# Patient Record
Sex: MALE | Race: WHITE | URBAN - METROPOLITAN AREA
[De-identification: names, ages, dates, MRNs, and addresses within clinical notes are randomized per-mention and may not be internally consistent; named-entity substitution may affect disease eponyms.]

---

## 2023-05-15 ENCOUNTER — OFFICE VISIT (OUTPATIENT)
Dept: URBAN - METROPOLITAN AREA CLINIC 107 | Facility: CLINIC | Age: 53
End: 2023-05-15

## 2023-05-31 ENCOUNTER — WEB ENCOUNTER (OUTPATIENT)
Dept: URBAN - METROPOLITAN AREA CLINIC 107 | Facility: CLINIC | Age: 53
End: 2023-05-31

## 2023-06-05 ENCOUNTER — OFFICE VISIT (OUTPATIENT)
Dept: URBAN - METROPOLITAN AREA CLINIC 107 | Facility: CLINIC | Age: 53
End: 2023-06-05
Payer: COMMERCIAL

## 2023-06-05 VITALS
WEIGHT: 186 LBS | HEART RATE: 56 BPM | BODY MASS INDEX: 25.19 KG/M2 | RESPIRATION RATE: 16 BRPM | DIASTOLIC BLOOD PRESSURE: 64 MMHG | HEIGHT: 72 IN | SYSTOLIC BLOOD PRESSURE: 110 MMHG | TEMPERATURE: 98.2 F

## 2023-06-05 DIAGNOSIS — R19.4 CHANGE IN BOWEL HABIT: ICD-10-CM

## 2023-06-05 PROCEDURE — 99244 OFF/OP CNSLTJ NEW/EST MOD 40: CPT | Performed by: NURSE PRACTITIONER

## 2023-06-05 PROCEDURE — 99204 OFFICE O/P NEW MOD 45 MIN: CPT | Performed by: NURSE PRACTITIONER

## 2023-06-05 RX ORDER — VALACYCLOVIR 1 G/1
1 TABLET TABLET, FILM COATED ORAL ONCE A DAY
Status: ACTIVE | COMMUNITY

## 2023-06-05 RX ORDER — FINASTERIDE 1 MG/1
1 TABLET TABLET, FILM COATED ORAL ONCE A DAY
Status: ACTIVE | COMMUNITY

## 2023-06-05 NOTE — HPI-TODAY'S VISIT:
52-year-old male with a history of herpes, allergic rhinitis, former tobacco user, referred by Dr. Reece Wing for evaluation of chronic diarrhea.  A copy of today's visit will be forwarded to the referring provider.  He reports a past medical history of "heart flutters/palpitations" secondary to caffeine use. Otherwise no cardiac history. He reports a month long history of diarrhea predominant bowel habits in 2015. Otherwise, he has been fairly healthy from a GI standpoint. Surgical history is notable for wisdom teeth extraction. There is no family history of liver disease, pancreas disease, stomach cancer, esophagus cancer or colon cancer. No family hisory of colon polyps. He does drink alcohol: around 2 glasses of wine 5 nights per week. No tobacco use currently. He is a former smoker. No drug use. He does admit Advil use as needed for muscle pain related to tennis playing; maybe 2 to 3 times per month.  There is no dysphagia. He admits occasional heartburn for which he takes Tums twice per month. There has been some lower abdominal cramping or gas pains, alleviated with passing gas or having or bowel movement. No nausea or vomiting. His appetite is good. He does admit some fear of eating out of concern for diarrhea. His weight is stable. He has bowel movements 2 or 3 times per day. Stools are loose and watery. There are episodes of more acute diarrhea with as many as 4 to 5 loose,watery stools in a matter of hours. There is no blood per rectum. No fever or chills. A course of Xifaxan did help initially, lasting effects for 6 to 8 weeks. He feels bloated occasionally as well. He admits some urgency after meals. He is not currently on a fiber supplement. He followed the BRAT diet for about one month when symptoms first started. He stopped fiber supplement. He has used Florastor, but has not continued this becaues it did not seem most effective. He did have stool studies with urgent care in October 2022 when diarrhea predominant habits began, which he states were negative.  Cologuard 8/16/2022:Negative.

## 2023-06-06 LAB
A/G RATIO: 1.6
ABSOLUTE BASOPHILS: 49
ABSOLUTE EOSINOPHILS: 81
ABSOLUTE LYMPHOCYTES: 2025
ABSOLUTE MONOCYTES: 551
ABSOLUTE NEUTROPHILS: 5395
ALBUMIN: 3.9
ALKALINE PHOSPHATASE: 63
ALT (SGPT): 18
AST (SGOT): 21
BASOPHILS: 0.6
BILIRUBIN, TOTAL: 0.8
BUN/CREATININE RATIO: (no result)
BUN: 15
C-REACTIVE PROTEIN, QUANT: 5.7
CALCIUM: 9
CARBON DIOXIDE, TOTAL: 28
CHLORIDE: 103
CREATININE: 0.93
EGFR: 100
EOSINOPHILS: 1
GLOBULIN, TOTAL: 2.5
GLUCOSE: 99
HEMATOCRIT: 40.5
HEMOGLOBIN: 14.1
IMMUNOGLOBULIN A, QN, SERUM: 136
INTERPRETATION: (no result)
LYMPHOCYTES: 25
MCH: 32.3
MCHC: 34.8
MCV: 92.9
MONOCYTES: 6.8
MPV: 10.4
NEUTROPHILS: 66.6
PLATELET COUNT: 310
POTASSIUM: 4.6
PROTEIN, TOTAL: 6.4
RDW: 11.6
RED BLOOD CELL COUNT: 4.36
SODIUM: 139
T-TRANSGLUTAMINASE (TTG) IGA: <1
WHITE BLOOD CELL COUNT: 8.1

## 2023-06-12 LAB
CALPROTECTIN, FECAL: 125
CLOSTRIDIUM DIFFICILE: (no result)

## 2023-06-13 ENCOUNTER — TELEPHONE ENCOUNTER (OUTPATIENT)
Dept: URBAN - METROPOLITAN AREA CLINIC 113 | Facility: CLINIC | Age: 53
End: 2023-06-13

## 2023-06-13 ENCOUNTER — LAB OUTSIDE AN ENCOUNTER (OUTPATIENT)
Dept: URBAN - METROPOLITAN AREA CLINIC 113 | Facility: CLINIC | Age: 53
End: 2023-06-13

## 2023-06-13 LAB — PANCREATIC ELASTASE, FECAL: 139

## 2023-06-13 RX ORDER — FINASTERIDE 1 MG/1
1 TABLET TABLET, FILM COATED ORAL ONCE A DAY
Status: ACTIVE | COMMUNITY

## 2023-06-13 RX ORDER — VALACYCLOVIR 1 G/1
1 TABLET TABLET, FILM COATED ORAL ONCE A DAY
Status: ACTIVE | COMMUNITY

## 2023-06-14 ENCOUNTER — TELEPHONE ENCOUNTER (OUTPATIENT)
Dept: URBAN - METROPOLITAN AREA CLINIC 113 | Facility: CLINIC | Age: 53
End: 2023-06-14

## 2023-06-14 ENCOUNTER — LAB OUTSIDE AN ENCOUNTER (OUTPATIENT)
Dept: URBAN - METROPOLITAN AREA CLINIC 113 | Facility: CLINIC | Age: 53
End: 2023-06-14

## 2023-07-25 ENCOUNTER — OFFICE VISIT (OUTPATIENT)
Dept: URBAN - METROPOLITAN AREA SURGERY CENTER 25 | Facility: SURGERY CENTER | Age: 53
End: 2023-07-25
Payer: COMMERCIAL

## 2023-07-25 ENCOUNTER — CLAIMS CREATED FROM THE CLAIM WINDOW (OUTPATIENT)
Dept: URBAN - METROPOLITAN AREA CLINIC 4 | Facility: CLINIC | Age: 53
End: 2023-07-25
Payer: COMMERCIAL

## 2023-07-25 DIAGNOSIS — R10.84 ABDOMINAL CRAMPING, GENERALIZED: ICD-10-CM

## 2023-07-25 DIAGNOSIS — R10.30 ABDOMINAL PAIN, LOWER: ICD-10-CM

## 2023-07-25 DIAGNOSIS — K52.831 COLITIS, UNSPEC (558.9): ICD-10-CM

## 2023-07-25 DIAGNOSIS — K52.831 COLLAGENOUS COLITIS: ICD-10-CM

## 2023-07-25 DIAGNOSIS — R19.7 ACUTE DIARRHEA: ICD-10-CM

## 2023-07-25 DIAGNOSIS — K52.831 CC (COLLAGENOUS COLITIS): ICD-10-CM

## 2023-07-25 PROCEDURE — 00811 ANES LWR INTST NDSC NOS: CPT | Performed by: ANESTHESIOLOGY

## 2023-07-25 PROCEDURE — 88342 IMHCHEM/IMCYTCHM 1ST ANTB: CPT | Performed by: PATHOLOGY

## 2023-07-25 PROCEDURE — 00811 ANES LWR INTST NDSC NOS: CPT | Performed by: NURSE ANESTHETIST, CERTIFIED REGISTERED

## 2023-07-25 PROCEDURE — G8907 PT DOC NO EVENTS ON DISCHARG: HCPCS | Performed by: INTERNAL MEDICINE

## 2023-07-25 PROCEDURE — 45380 COLONOSCOPY AND BIOPSY: CPT | Performed by: INTERNAL MEDICINE

## 2023-07-25 PROCEDURE — 88313 SPECIAL STAINS GROUP 2: CPT | Performed by: PATHOLOGY

## 2023-07-25 PROCEDURE — 88305 TISSUE EXAM BY PATHOLOGIST: CPT | Performed by: PATHOLOGY

## 2023-07-25 RX ORDER — FINASTERIDE 1 MG/1
1 TABLET TABLET, FILM COATED ORAL ONCE A DAY
Status: ACTIVE | COMMUNITY

## 2023-07-25 RX ORDER — VALACYCLOVIR 1 G/1
1 TABLET TABLET, FILM COATED ORAL ONCE A DAY
Status: ACTIVE | COMMUNITY

## 2023-08-03 ENCOUNTER — OFFICE VISIT (OUTPATIENT)
Dept: URBAN - METROPOLITAN AREA CLINIC 113 | Facility: CLINIC | Age: 53
End: 2023-08-03

## 2023-08-08 ENCOUNTER — OFFICE VISIT (OUTPATIENT)
Dept: URBAN - METROPOLITAN AREA CLINIC 113 | Facility: CLINIC | Age: 53
End: 2023-08-08
Payer: COMMERCIAL

## 2023-08-08 VITALS
HEART RATE: 55 BPM | DIASTOLIC BLOOD PRESSURE: 62 MMHG | BODY MASS INDEX: 23.98 KG/M2 | WEIGHT: 177 LBS | TEMPERATURE: 97.1 F | HEIGHT: 72 IN | RESPIRATION RATE: 14 BRPM | SYSTOLIC BLOOD PRESSURE: 115 MMHG

## 2023-08-08 DIAGNOSIS — R19.7 CHRONIC DIARRHEA: ICD-10-CM

## 2023-08-08 DIAGNOSIS — K86.81 EXOCRINE PANCREATIC INSUFFICIENCY: ICD-10-CM

## 2023-08-08 DIAGNOSIS — K52.831 COLLAGENOUS COLITIS: ICD-10-CM

## 2023-08-08 PROCEDURE — 99214 OFFICE O/P EST MOD 30 MIN: CPT | Performed by: INTERNAL MEDICINE

## 2023-08-08 RX ORDER — VALACYCLOVIR 1 G/1
1 TABLET TABLET, FILM COATED ORAL ONCE A DAY
Status: ACTIVE | COMMUNITY

## 2023-08-08 RX ORDER — BUDESONIDE 9 MG/1
1 TABLET IN THE MORNING TABLET, FILM COATED, EXTENDED RELEASE ORAL ONCE A DAY
Qty: 30 | Refills: 1 | OUTPATIENT
Start: 2023-08-08 | End: 2023-10-07

## 2023-08-08 RX ORDER — FINASTERIDE 1 MG/1
1 TABLET TABLET, FILM COATED ORAL ONCE A DAY
Status: ACTIVE | COMMUNITY

## 2023-08-08 NOTE — HPI-TODAY'S VISIT:
53 yo male with chronic diarrhea, ongoing since October 2022, possibly related to a functional bowel disorder, improved with a course of Xifaxan, presenting for follow up.  Stools studies revealed negative C. difficile toxin, and low pancreas elastase. Fecal calprotectin was elevated. Celiac panel, CBC, CMP and CRP were normal.    A colonoscopy performed for chronic diarrhea and positive fecal calprotectin on 7/25/23 revealed a normal ileum, normal rectum, and normal mucosa in the colon s/p random biopsies. Biopsies were positive for collagenous colitis.   CT a/p with contrast 6/22/23 was unremarkable.  His symptoms are unchanged. A low FODMAP diet was not overly helpful. No blood per rectum. No abdominal pain. He is in good spirits.

## 2023-10-04 ENCOUNTER — ERX REFILL RESPONSE (OUTPATIENT)
Dept: URBAN - METROPOLITAN AREA CLINIC 113 | Facility: CLINIC | Age: 53
End: 2023-10-04

## 2023-10-04 RX ORDER — BUDESONIDE 9 MG/1
TAKE 1 TABLET BY MOUTH EVERY DAY IN THE MORNING FOR 30 DAYS TABLET, EXTENDED RELEASE ORAL
Qty: 30 TABLET | Refills: 1 | OUTPATIENT

## 2023-10-04 RX ORDER — BUDESONIDE 9 MG/1
1 TABLET IN THE MORNING TABLET, FILM COATED, EXTENDED RELEASE ORAL ONCE A DAY
Qty: 30 | Refills: 1 | OUTPATIENT

## 2023-10-10 ENCOUNTER — OFFICE VISIT (OUTPATIENT)
Dept: URBAN - METROPOLITAN AREA CLINIC 113 | Facility: CLINIC | Age: 53
End: 2023-10-10
Payer: COMMERCIAL

## 2023-10-10 VITALS
HEART RATE: 76 BPM | TEMPERATURE: 97.8 F | BODY MASS INDEX: 23.65 KG/M2 | SYSTOLIC BLOOD PRESSURE: 121 MMHG | WEIGHT: 174.6 LBS | HEIGHT: 72 IN | DIASTOLIC BLOOD PRESSURE: 66 MMHG | RESPIRATION RATE: 14 BRPM

## 2023-10-10 DIAGNOSIS — K52.831 COLLAGENOUS COLITIS: ICD-10-CM

## 2023-10-10 DIAGNOSIS — R19.7 ACUTE DIARRHEA: ICD-10-CM

## 2023-10-10 DIAGNOSIS — K86.81 EXOCRINE PANCREATIC INSUFFICIENCY: ICD-10-CM

## 2023-10-10 PROCEDURE — 99214 OFFICE O/P EST MOD 30 MIN: CPT | Performed by: NURSE PRACTITIONER

## 2023-10-10 RX ORDER — PANCRELIPASE 36000; 180000; 114000 [USP'U]/1; [USP'U]/1; [USP'U]/1
2 TABLETS WITH MEALS, AND 1 TABLET WITH SNACKS CAPSULE, DELAYED RELEASE PELLETS ORAL
Qty: 630 | Refills: 3 | OUTPATIENT
Start: 2023-10-10 | End: 2024-10-04

## 2023-10-10 RX ORDER — VALACYCLOVIR 1 G/1
1 TABLET TABLET, FILM COATED ORAL ONCE A DAY
Status: ACTIVE | COMMUNITY

## 2023-10-10 RX ORDER — BUDESONIDE 9 MG/1
TAKE 1 TABLET BY MOUTH EVERY DAY IN THE MORNING FOR 30 DAYS TABLET, EXTENDED RELEASE ORAL
Qty: 30 TABLET | Refills: 1 | Status: ACTIVE | COMMUNITY

## 2023-10-10 RX ORDER — FINASTERIDE 1 MG/1
1 TABLET TABLET, FILM COATED ORAL ONCE A DAY
Status: ACTIVE | COMMUNITY

## 2023-10-10 NOTE — HPI-TODAY'S VISIT:
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  He was seen in the office 8/8/2023 in follow-up for evaluation of chronic diarrhea.  Stool studies revealed a low pancreas elastase consistent with exocrine pancreas insufficiency.  He was also noted to have an elevated fecal calprotectin with colonoscopy notable for random biopsies positive for collagenous colitis.  He was treated with budesonide 9 mg daily for 8 weeks, then stopping.  A trial of Creon was a consideration.  He experienced improvement in his diarrhea with use of budesonide 9 mg daily. He completed the medication on Friday. He celebrated his birthday over the weekend with rich foods. He had some recurrence of diarrhea over the weekend, which resolved with one dose of kaopectate. There is no abdominal pain, nausea or vomiting. His appetite is fair. No blood per rectum.

## 2023-10-23 ENCOUNTER — TELEPHONE ENCOUNTER (OUTPATIENT)
Dept: URBAN - METROPOLITAN AREA CLINIC 113 | Facility: CLINIC | Age: 53
End: 2023-10-23

## 2023-10-23 RX ORDER — PANCRELIPASE LIPASE, PANCRELIPASE PROTEASE, PANCRELIPASE AMYLASE 40000; 126000; 168000 [USP'U]/1; [USP'U]/1; [USP'U]/1
2 CAPSULES WITH MEALS, ONE WITH SNACKS CAPSULE, DELAYED RELEASE ORAL
Qty: 210 | Refills: 3 | OUTPATIENT
Start: 2023-10-23 | End: 2024-02-19

## 2023-11-15 ENCOUNTER — WEB ENCOUNTER (OUTPATIENT)
Dept: URBAN - METROPOLITAN AREA CLINIC 113 | Facility: CLINIC | Age: 53
End: 2023-11-15

## 2024-01-10 ENCOUNTER — OFFICE VISIT (OUTPATIENT)
Dept: URBAN - METROPOLITAN AREA CLINIC 113 | Facility: CLINIC | Age: 54
End: 2024-01-10
Payer: COMMERCIAL

## 2024-01-10 VITALS
DIASTOLIC BLOOD PRESSURE: 63 MMHG | HEIGHT: 72 IN | HEART RATE: 63 BPM | RESPIRATION RATE: 14 BRPM | SYSTOLIC BLOOD PRESSURE: 125 MMHG | WEIGHT: 181 LBS | BODY MASS INDEX: 24.52 KG/M2 | TEMPERATURE: 97.3 F

## 2024-01-10 DIAGNOSIS — K52.831 COLLAGENOUS COLITIS: ICD-10-CM

## 2024-01-10 DIAGNOSIS — R19.7 ACUTE DIARRHEA: ICD-10-CM

## 2024-01-10 DIAGNOSIS — K86.81 EXOCRINE PANCREATIC INSUFFICIENCY: ICD-10-CM

## 2024-01-10 PROCEDURE — 99214 OFFICE O/P EST MOD 30 MIN: CPT | Performed by: NURSE PRACTITIONER

## 2024-01-10 RX ORDER — FINASTERIDE 1 MG/1
1 TABLET TABLET, FILM COATED ORAL ONCE A DAY
Status: ACTIVE | COMMUNITY

## 2024-01-10 RX ORDER — PANCRELIPASE 36000; 180000; 114000 [USP'U]/1; [USP'U]/1; [USP'U]/1
2 TABLETS WITH MEALS, AND 1 TABLET WITH SNACKS CAPSULE, DELAYED RELEASE PELLETS ORAL
Qty: 630 | Refills: 3 | Status: ON HOLD | COMMUNITY
Start: 2023-10-10 | End: 2024-10-04

## 2024-01-10 RX ORDER — BUDESONIDE 9 MG/1
TAKE 1 TABLET BY MOUTH EVERY DAY IN THE MORNING FOR 30 DAYS TABLET, EXTENDED RELEASE ORAL
Qty: 30 TABLET | Refills: 1 | Status: ACTIVE | COMMUNITY

## 2024-01-10 RX ORDER — PANCRELIPASE LIPASE, PANCRELIPASE PROTEASE, PANCRELIPASE AMYLASE 40000; 126000; 168000 [USP'U]/1; [USP'U]/1; [USP'U]/1
2 CAPSULES WITH MEALS, ONE WITH SNACKS CAPSULE, DELAYED RELEASE ORAL
Qty: 210 | Refills: 3 | Status: ACTIVE | COMMUNITY
Start: 2023-10-23 | End: 2024-02-19

## 2024-01-10 RX ORDER — COLESEVELAM HYDROCHLORIDE 625 MG/1
1 TABLETS WITH A MEAL TABLET, COATED ORAL ONCE DAILY
Qty: 90 | Refills: 3 | OUTPATIENT
Start: 2024-01-10

## 2024-01-10 RX ORDER — PANCRELIPASE LIPASE, PANCRELIPASE PROTEASE, PANCRELIPASE AMYLASE 40000; 126000; 168000 [USP'U]/1; [USP'U]/1; [USP'U]/1
2 CAPSULES WITH MEALS, ONE WITH SNACKS CAPSULE, DELAYED RELEASE ORAL
OUTPATIENT
Start: 2023-10-23

## 2024-01-10 RX ORDER — VALACYCLOVIR 1 G/1
1 TABLET TABLET, FILM COATED ORAL ONCE A DAY
Status: ACTIVE | COMMUNITY

## 2024-01-10 NOTE — HPI-TODAY'S VISIT:
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  He was seen in the office 8/8/2023 in follow-up for evaluation of chronic diarrhea.  Stool studies revealed a low pancreas elastase consistent with exocrine pancreas insufficiency.  He was also noted to have an elevated fecal calprotectin with colonoscopy notable for random biopsies positive for collagenous colitis.  He was treated with budesonide 9 mg daily for 8 weeks, then stopping.  A trial of Creon was a consideration.  He experienced improvement in his diarrhea with use of budesonide 9 mg daily. He completed the medication on Friday. He celebrated his birthday over the weekend with rich foods. He had some recurrence of diarrhea over the weekend, which resolved with one dose of kaopectate. There is no abdominal pain, nausea or vomiting. His appetite is fair. No blood per rectum.  Following his last visit, he did attempt pancreas enzymes. Unfortunately, diarrhea recurred with at least 10-12 bowel movements per day. He was recommended resumption of budesonide 9 mg daily.  1/10/24: His diarrhea did improve with resumption of budesonide ER 9 mg daily. He tells me that he did miss a couple of days, and feels that is guts felt "a little weird." He is taking Lino-Pep. He has not been as consistent with using every meal. He thinks the Lino-Pep is helping. There is no blood per rectum.

## 2024-03-06 ENCOUNTER — OV EP (OUTPATIENT)
Dept: URBAN - METROPOLITAN AREA CLINIC 113 | Facility: CLINIC | Age: 54
End: 2024-03-06
Payer: COMMERCIAL

## 2024-03-06 VITALS
RESPIRATION RATE: 14 BRPM | WEIGHT: 182.2 LBS | BODY MASS INDEX: 24.68 KG/M2 | HEIGHT: 72 IN | DIASTOLIC BLOOD PRESSURE: 73 MMHG | HEART RATE: 61 BPM | TEMPERATURE: 97.1 F | SYSTOLIC BLOOD PRESSURE: 131 MMHG

## 2024-03-06 DIAGNOSIS — K52.831 COLLAGENOUS COLITIS: ICD-10-CM

## 2024-03-06 DIAGNOSIS — R19.7 ACUTE DIARRHEA: ICD-10-CM

## 2024-03-06 DIAGNOSIS — K86.1 ACUTE ON CHRONIC PANCREATITIS: ICD-10-CM

## 2024-03-06 PROCEDURE — 99214 OFFICE O/P EST MOD 30 MIN: CPT | Performed by: NURSE PRACTITIONER

## 2024-03-06 RX ORDER — PANCRELIPASE 36000; 180000; 114000 [USP'U]/1; [USP'U]/1; [USP'U]/1
2 TABLETS WITH MEALS, AND 1 TABLET WITH SNACKS CAPSULE, DELAYED RELEASE PELLETS ORAL
Qty: 630 | Refills: 3 | Status: ON HOLD | COMMUNITY
Start: 2023-10-10 | End: 2024-10-04

## 2024-03-06 RX ORDER — FINASTERIDE 1 MG/1
1 TABLET TABLET, FILM COATED ORAL ONCE A DAY
Status: ACTIVE | COMMUNITY

## 2024-03-06 RX ORDER — PANCRELIPASE LIPASE, PANCRELIPASE PROTEASE, PANCRELIPASE AMYLASE 40000; 126000; 168000 [USP'U]/1; [USP'U]/1; [USP'U]/1
2 CAPSULES WITH MEALS, ONE WITH SNACKS CAPSULE, DELAYED RELEASE ORAL
Status: ACTIVE | COMMUNITY
Start: 2023-10-23

## 2024-03-06 RX ORDER — COLESEVELAM HYDROCHLORIDE 625 MG/1
1 TABLETS WITH A MEAL TABLET, COATED ORAL TWICE A DAY
Qty: 180 TABLET | Refills: 3 | OUTPATIENT
Start: 2024-01-10

## 2024-03-06 RX ORDER — VALACYCLOVIR 1 G/1
1 TABLET TABLET, FILM COATED ORAL ONCE A DAY
Status: ACTIVE | COMMUNITY

## 2024-03-06 RX ORDER — BUDESONIDE 9 MG/1
TAKE 1 TABLET BY MOUTH EVERY DAY IN THE MORNING FOR 30 DAYS TABLET, EXTENDED RELEASE ORAL
Qty: 30 TABLET | Refills: 1 | Status: ON HOLD | COMMUNITY

## 2024-03-06 RX ORDER — COLESEVELAM HYDROCHLORIDE 625 MG/1
1 TABLETS WITH A MEAL TABLET, COATED ORAL ONCE DAILY
Qty: 90 | Refills: 3 | Status: ACTIVE | COMMUNITY
Start: 2024-01-10

## 2024-03-06 RX ORDER — PANCRELIPASE LIPASE, PANCRELIPASE PROTEASE, PANCRELIPASE AMYLASE 40000; 126000; 168000 [USP'U]/1; [USP'U]/1; [USP'U]/1
2 CAPSULES WITH MEALS, ONE WITH SNACKS CAPSULE, DELAYED RELEASE ORAL
OUTPATIENT
Start: 2023-10-23

## 2024-03-06 NOTE — HPI-TODAY'S VISIT:
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  He was seen in the office 8/8/2023 in follow-up for evaluation of chronic diarrhea.  Stool studies revealed a low pancreas elastase consistent with exocrine pancreas insufficiency.  He was also noted to have an elevated fecal calprotectin with colonoscopy notable for random biopsies positive for collagenous colitis.  He was treated with budesonide 9 mg daily for 8 weeks, then stopping.  A trial of Creon was a consideration.  He experienced improvement in his diarrhea with use of budesonide 9 mg daily. He completed the medication on Friday. He celebrated his birthday over the weekend with rich foods. He had some recurrence of diarrhea over the weekend, which resolved with one dose of kaopectate. There is no abdominal pain, nausea or vomiting. His appetite is fair. No blood per rectum.  Following his last visit, he did attempt pancreas enzymes. Unfortunately, diarrhea recurred with at least 10-12 bowel movements per day. He was recommended resumption of budesonide 9 mg daily.  1/10/24: His diarrhea did improve with resumption of budesonide ER 9 mg daily. He tells me that he did miss a couple of days, and feels that is guts felt "a little weird." He is taking Lino-Pep. He has not been as consistent with using every meal. He thinks the Lino-Pep is helping. There is no blood per rectum.  3/6/24: He tells me that he improved following the course of budesonide. He stopped the budesonide, and about 5 weeks later, he started with diarrhea. He has been setting alarms to remind him to be consistent with the Welchol and Zenpep. He has tried to moderate his diet and alcohol intake. He takes Imodium with simethicone, which can decrease bowel movements from 10 to 12 to 6 to 8 stools per day. Stools are nonbloody. There is no abdominal pain. No fever or chills.

## 2024-03-15 LAB
CALPROTECTIN, FECAL: 110
PANCREATIC ELASTASE, FECAL: 182

## 2024-04-12 ENCOUNTER — OV EP (OUTPATIENT)
Dept: URBAN - METROPOLITAN AREA CLINIC 113 | Facility: CLINIC | Age: 54
End: 2024-04-12

## 2024-05-10 ENCOUNTER — OFFICE VISIT (OUTPATIENT)
Dept: URBAN - METROPOLITAN AREA CLINIC 113 | Facility: CLINIC | Age: 54
End: 2024-05-10
Payer: COMMERCIAL

## 2024-05-10 ENCOUNTER — DASHBOARD ENCOUNTERS (OUTPATIENT)
Age: 54
End: 2024-05-10

## 2024-05-10 VITALS
SYSTOLIC BLOOD PRESSURE: 105 MMHG | HEART RATE: 58 BPM | TEMPERATURE: 97.5 F | BODY MASS INDEX: 25.06 KG/M2 | HEIGHT: 72 IN | WEIGHT: 185 LBS | DIASTOLIC BLOOD PRESSURE: 52 MMHG | RESPIRATION RATE: 16 BRPM

## 2024-05-10 DIAGNOSIS — K86.81 EXOCRINE PANCREATIC INSUFFICIENCY: ICD-10-CM

## 2024-05-10 DIAGNOSIS — K52.831 COLLAGENOUS COLITIS: ICD-10-CM

## 2024-05-10 DIAGNOSIS — K52.9 CHRONIC DIARRHEA: ICD-10-CM

## 2024-05-10 PROCEDURE — 99213 OFFICE O/P EST LOW 20 MIN: CPT | Performed by: NURSE PRACTITIONER

## 2024-05-10 RX ORDER — VALACYCLOVIR 1 G/1
1 TABLET TABLET, FILM COATED ORAL ONCE A DAY
Status: ACTIVE | COMMUNITY

## 2024-05-10 RX ORDER — PANCRELIPASE LIPASE, PANCRELIPASE PROTEASE, PANCRELIPASE AMYLASE 40000; 126000; 168000 [USP'U]/1; [USP'U]/1; [USP'U]/1
2 CAPSULES WITH MEALS, ONE WITH SNACKS CAPSULE, DELAYED RELEASE ORAL
OUTPATIENT

## 2024-05-10 RX ORDER — COLESEVELAM HYDROCHLORIDE 625 MG/1
1 TABLETS WITH A MEAL TABLET, COATED ORAL TWICE A DAY
Qty: 180 TABLET | Refills: 3 | OUTPATIENT

## 2024-05-10 RX ORDER — COLESEVELAM HYDROCHLORIDE 625 MG/1
1 TABLETS WITH A MEAL TABLET, COATED ORAL TWICE A DAY
Qty: 180 TABLET | Refills: 3 | Status: ACTIVE | COMMUNITY
Start: 2024-01-10

## 2024-05-10 RX ORDER — FINASTERIDE 1 MG/1
1 TABLET TABLET, FILM COATED ORAL ONCE A DAY
Status: ACTIVE | COMMUNITY

## 2024-05-10 RX ORDER — PANCRELIPASE LIPASE, PANCRELIPASE PROTEASE, PANCRELIPASE AMYLASE 40000; 126000; 168000 [USP'U]/1; [USP'U]/1; [USP'U]/1
2 CAPSULES WITH MEALS, ONE WITH SNACKS CAPSULE, DELAYED RELEASE ORAL
Qty: 270 | Refills: 3 | Status: ACTIVE | COMMUNITY

## 2024-05-10 RX ORDER — PANCRELIPASE 36000; 180000; 114000 [USP'U]/1; [USP'U]/1; [USP'U]/1
2 TABLETS WITH MEALS, AND 1 TABLET WITH SNACKS CAPSULE, DELAYED RELEASE PELLETS ORAL
Qty: 630 | Refills: 3 | Status: ON HOLD | COMMUNITY
Start: 2023-10-10 | End: 2024-10-04

## 2024-05-10 RX ORDER — BUDESONIDE 9 MG/1
TAKE 1 TABLET BY MOUTH EVERY DAY IN THE MORNING FOR 30 DAYS TABLET, EXTENDED RELEASE ORAL
Qty: 30 TABLET | Refills: 1 | Status: ON HOLD | COMMUNITY

## 2024-10-07 ENCOUNTER — ERX REFILL RESPONSE (OUTPATIENT)
Dept: URBAN - METROPOLITAN AREA CLINIC 113 | Facility: CLINIC | Age: 54
End: 2024-10-07

## 2024-10-07 RX ORDER — PANCRELIPASE LIPASE, PANCRELIPASE PROTEASE, PANCRELIPASE AMYLASE 40000; 126000; 168000 [USP'U]/1; [USP'U]/1; [USP'U]/1
2 CAPSULES WITH MEALS, ONE WITH SNACKS CAPSULE, DELAYED RELEASE ORAL
Qty: 270 | Refills: 3 | OUTPATIENT

## 2024-10-07 RX ORDER — PANCRELIPASE LIPASE, PANCRELIPASE PROTEASE, PANCRELIPASE AMYLASE 40000; 126000; 168000 [USP'U]/1; [USP'U]/1; [USP'U]/1
TAKE 2 CAPSULES BY MOUTH WITH MEALS 3 TIMES A DAY AND TAKE 1 CAPSULE BY MOUTH WITH SNACKS DAILY CAPSULE, DELAYED RELEASE ORAL
Qty: 200 CAPSULE | Refills: 5 | OUTPATIENT

## 2024-11-12 ENCOUNTER — OFFICE VISIT (OUTPATIENT)
Dept: URBAN - METROPOLITAN AREA CLINIC 113 | Facility: CLINIC | Age: 54
End: 2024-11-12
Payer: COMMERCIAL

## 2024-11-12 VITALS
HEIGHT: 72 IN | RESPIRATION RATE: 16 BRPM | DIASTOLIC BLOOD PRESSURE: 71 MMHG | WEIGHT: 190.2 LBS | HEART RATE: 60 BPM | TEMPERATURE: 97.7 F | SYSTOLIC BLOOD PRESSURE: 113 MMHG | BODY MASS INDEX: 25.76 KG/M2

## 2024-11-12 DIAGNOSIS — K52.831 COLLAGENOUS COLITIS: ICD-10-CM

## 2024-11-12 DIAGNOSIS — K52.9 CHRONIC DIARRHEA: ICD-10-CM

## 2024-11-12 DIAGNOSIS — K86.81 EXOCRINE PANCREATIC INSUFFICIENCY: ICD-10-CM

## 2024-11-12 PROCEDURE — 99213 OFFICE O/P EST LOW 20 MIN: CPT | Performed by: INTERNAL MEDICINE

## 2024-11-12 RX ORDER — COLESEVELAM HYDROCHLORIDE 625 MG/1
1 TABLETS WITH A MEAL TABLET, COATED ORAL TWICE A DAY
Qty: 180 TABLET | Refills: 3 | OUTPATIENT

## 2024-11-12 RX ORDER — COLESEVELAM HYDROCHLORIDE 625 MG/1
1 TABLETS WITH A MEAL TABLET, COATED ORAL TWICE A DAY
Qty: 180 TABLET | Refills: 3 | Status: ACTIVE | COMMUNITY

## 2024-11-12 RX ORDER — VALACYCLOVIR 1 G/1
1 TABLET TABLET, FILM COATED ORAL ONCE A DAY
Status: ACTIVE | COMMUNITY

## 2024-11-12 RX ORDER — BUDESONIDE 9 MG/1
TAKE 1 TABLET BY MOUTH EVERY DAY IN THE MORNING FOR 30 DAYS TABLET, EXTENDED RELEASE ORAL
Qty: 30 TABLET | Refills: 1 | Status: ON HOLD | COMMUNITY

## 2024-11-12 RX ORDER — FINASTERIDE 1 MG/1
1 TABLET TABLET, FILM COATED ORAL ONCE A DAY
Status: ACTIVE | COMMUNITY

## 2024-11-12 RX ORDER — PANCRELIPASE LIPASE, PANCRELIPASE PROTEASE, PANCRELIPASE AMYLASE 40000; 126000; 168000 [USP'U]/1; [USP'U]/1; [USP'U]/1
TAKE 2 CAPSULES BY MOUTH WITH MEALS 3 TIMES A DAY AND TAKE 1 CAPSULE BY MOUTH WITH SNACKS DAILY CAPSULE, DELAYED RELEASE ORAL
Qty: 200 CAPSULE | Refills: 5 | Status: ACTIVE | COMMUNITY

## 2024-11-12 RX ORDER — PANCRELIPASE LIPASE, PANCRELIPASE PROTEASE, PANCRELIPASE AMYLASE 40000; 126000; 168000 [USP'U]/1; [USP'U]/1; [USP'U]/1
2 CAPSULES WITH MEALS, ONE WITH SNACKS CAPSULE, DELAYED RELEASE ORAL
OUTPATIENT

## 2024-11-12 NOTE — HPI-OTHER HISTORIES
CT scan of abdomen pelvis with contrast on 6/22/2023 revealed the liver to be without any masses there is a subcentimeter hypodensity in the right liver, likely cyst.  The pancreas is normal as is the spleen.  There is no inflammation or adenopathy in the intestines.  There is no acute findings.  Colonoscopy on 7/25/2023 revealed a normal terminal ileum, there were no polyps or evidence for colitis random biopsies were obtained that revealed collagenous colitis.

## 2024-11-12 NOTE — HPI-TODAY'S VISIT:
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  He was seen in the office 8/8/2023 in follow-up for evaluation of chronic diarrhea.  Stool studies revealed a low pancreas elastase consistent with exocrine pancreas insufficiency.  He was also noted to have an elevated fecal calprotectin with colonoscopy notable for random biopsies positive for collagenous colitis.  He was treated with budesonide 9 mg daily for 8 weeks, then stopping.   He has had 2 episodes of diarrhea lasting about 4 weeks each since his last appointment.  When he has the diarrhea we will have 7-8 stools per day that are liquid.  Sometimes he will have a urgency at least for the first week.  He has had increased stress recently.  He denies any antibiotic use.  He does use Pepto-Bismol and that seems to help as well his diarrhea now is starting to get better and he has semiformed stools currently.  When he is good his stools are normal about 2/day.  He has had no weight loss or fever.  He does use Zenpep 2 tablets with each meal.  He has noticed that when he eats a big fatty meal he will have lots of diarrhea the next few days.  He does use his bile binder 1 tablet twice a day.  He rarely will have heartburn there is been no dysphagia and he has no abdominal pain but sometimes he has some gas discomfort.  There is no nausea or vomiting.  He has noticed no bright red blood or melena.  He has not been on any antibiotics recently and has had no well water or stream water use.  Is been no new medications.  Blood work on 9/23/2024 revealed an AST of 15, ALT 16, alk phosphatase 54, total bili 0.6 and albumin 4.1.  On 7/25/2024 WBC is 7.1, hemoglobin 13.9 and platelet count 282,000.  Sodium 138 potassium 4.3 BUN 17 creatinine 1.02.

## 2024-11-13 ENCOUNTER — TELEPHONE ENCOUNTER (OUTPATIENT)
Dept: URBAN - METROPOLITAN AREA CLINIC 113 | Facility: CLINIC | Age: 54
End: 2024-11-13

## 2024-11-20 ENCOUNTER — TELEPHONE ENCOUNTER (OUTPATIENT)
Dept: URBAN - METROPOLITAN AREA CLINIC 113 | Facility: CLINIC | Age: 54
End: 2024-11-20

## 2024-12-04 ENCOUNTER — WEB ENCOUNTER (OUTPATIENT)
Dept: URBAN - METROPOLITAN AREA CLINIC 113 | Facility: CLINIC | Age: 54
End: 2024-12-04

## 2024-12-04 RX ORDER — COLESEVELAM HYDROCHLORIDE 625 MG/1
2 TABLETS WITH A MEAL TABLET, COATED ORAL TWICE A DAY
Qty: 360 TABLET | Refills: 3

## 2024-12-12 ENCOUNTER — WEB ENCOUNTER (OUTPATIENT)
Dept: URBAN - METROPOLITAN AREA CLINIC 113 | Facility: CLINIC | Age: 54
End: 2024-12-12

## 2024-12-12 RX ORDER — BUDESONIDE 3 MG/1
3 CAPSULES CAPSULE ORAL ONCE A DAY
Qty: 180 CAPSULE | Refills: 0 | OUTPATIENT
Start: 2024-12-12

## 2025-03-14 ENCOUNTER — OFFICE VISIT (OUTPATIENT)
Dept: URBAN - METROPOLITAN AREA CLINIC 113 | Facility: CLINIC | Age: 55
End: 2025-03-14

## 2025-03-14 RX ORDER — FINASTERIDE 1 MG/1
1 TABLET TABLET, FILM COATED ORAL ONCE A DAY
Status: ACTIVE | COMMUNITY

## 2025-03-14 RX ORDER — PANCRELIPASE LIPASE, PANCRELIPASE PROTEASE, PANCRELIPASE AMYLASE 40000; 126000; 168000 [USP'U]/1; [USP'U]/1; [USP'U]/1
2 CAPSULES WITH MEALS, ONE WITH SNACKS CAPSULE, DELAYED RELEASE ORAL
OUTPATIENT

## 2025-03-14 RX ORDER — VALACYCLOVIR 1 G/1
1 TABLET TABLET, FILM COATED ORAL ONCE A DAY
Status: ACTIVE | COMMUNITY

## 2025-03-14 RX ORDER — COLESEVELAM HYDROCHLORIDE 625 MG/1
1 TABLETS WITH A MEAL TABLET, COATED ORAL TWICE A DAY
Qty: 180 TABLET | Refills: 3 | OUTPATIENT

## 2025-03-14 RX ORDER — COLESEVELAM HYDROCHLORIDE 625 MG/1
2 TABLETS WITH A MEAL TABLET, COATED ORAL TWICE A DAY
Qty: 360 TABLET | Refills: 3 | Status: ACTIVE | COMMUNITY

## 2025-03-14 RX ORDER — PANCRELIPASE LIPASE, PANCRELIPASE PROTEASE, PANCRELIPASE AMYLASE 40000; 126000; 168000 [USP'U]/1; [USP'U]/1; [USP'U]/1
TAKE 2 CAPSULES BY MOUTH WITH MEALS 3 TIMES A DAY AND TAKE 1 CAPSULE BY MOUTH WITH SNACKS DAILY CAPSULE, DELAYED RELEASE ORAL
Qty: 200 CAPSULE | Refills: 5 | Status: ACTIVE | COMMUNITY

## 2025-03-14 RX ORDER — BUDESONIDE 3 MG/1
3 CAPSULES CAPSULE ORAL ONCE A DAY
Qty: 180 CAPSULE | Refills: 0 | Status: ACTIVE | COMMUNITY
Start: 2024-12-12

## 2025-03-14 RX ORDER — PANCRELIPASE LIPASE, PANCRELIPASE PROTEASE, PANCRELIPASE AMYLASE 40000; 126000; 168000 [USP'U]/1; [USP'U]/1; [USP'U]/1
2 CAPSULES WITH MEALS, ONE WITH SNACKS CAPSULE, DELAYED RELEASE ORAL
Status: ACTIVE | COMMUNITY

## 2025-03-14 RX ORDER — BUDESONIDE 9 MG/1
TAKE 1 TABLET BY MOUTH EVERY DAY IN THE MORNING FOR 30 DAYS TABLET, EXTENDED RELEASE ORAL
Qty: 30 TABLET | Refills: 1 | Status: ON HOLD | COMMUNITY

## 2025-03-14 NOTE — HPI-TODAY'S VISIT:
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  He was seen in the office 8/8/2023 in follow-up for evaluation of chronic diarrhea.  Stool studies revealed a low pancreas elastase consistent with exocrine pancreas insufficiency.  He was also noted to have an elevated fecal calprotectin with colonoscopy notable for random biopsies positive for collagenous colitis.  He was treated with budesonide 9 mg daily for 8 weeks, then stopping.   He has had 2 episodes of diarrhea lasting about 4 weeks each since his last appointment.  When he has the diarrhea we will have 7-8 stools per day that are liquid.  Sometimes he will have a urgency at least for the first week.  He has had increased stress recently.  He denies any antibiotic use.  He does use Pepto-Bismol and that seems to help as well his diarrhea now is starting to get better and he has semiformed stools currently.  When he is good his stools are normal about 2/day.  He has had no weight loss or fever.  He does use Zenpep 2 tablets with each meal.  He has noticed that when he eats a big fatty meal he will have lots of diarrhea the next few days.  He does use his bile binder 1 tablet twice a day.  He rarely will have heartburn there is been no dysphagia and he has no abdominal pain but sometimes he has some gas discomfort.  There is no nausea or vomiting.  He has noticed no bright red blood or melena.  He has not been on any antibiotics recently and has had no well water or stream water use.  Is been no new medications.  Blood work on 2/20/2025 revealed a sodium 139 potassium 4.4 BUN 16 creatinine 0.92.  AST 27, ALT 24, alkaline phosphate 60, total bili 0.7, albumin 4.0.  Blood work on 9/23/2024 revealed an AST of 15, ALT 16, alk phosphatase 54, total bili 0.6 and albumin 4.1.  On 7/25/2024 WBC is 7.1, hemoglobin 13.9 and platelet count 282,000.  Sodium 138 potassium 4.3 BUN 17 creatinine 1.02.

## 2025-04-25 ENCOUNTER — OFFICE VISIT (OUTPATIENT)
Dept: URBAN - METROPOLITAN AREA CLINIC 113 | Facility: CLINIC | Age: 55
End: 2025-04-25
Payer: COMMERCIAL

## 2025-04-25 DIAGNOSIS — K86.81 EXOCRINE PANCREATIC INSUFFICIENCY: ICD-10-CM

## 2025-04-25 DIAGNOSIS — K52.831 COLLAGENOUS COLITIS: ICD-10-CM

## 2025-04-25 DIAGNOSIS — R19.7 ACUTE DIARRHEA: ICD-10-CM

## 2025-04-25 PROCEDURE — 99213 OFFICE O/P EST LOW 20 MIN: CPT | Performed by: INTERNAL MEDICINE

## 2025-04-25 RX ORDER — COLESEVELAM HYDROCHLORIDE 625 MG/1
1 TABLETS WITH A MEAL TABLET, COATED ORAL TWICE A DAY
Qty: 180 TABLET | Refills: 3 | Status: ACTIVE | COMMUNITY

## 2025-04-25 RX ORDER — BUDESONIDE 3 MG/1
3 CAPSULES CAPSULE ORAL ONCE A DAY
Qty: 270 CAPSULE | Refills: 2
Start: 2024-12-12

## 2025-04-25 RX ORDER — PANCRELIPASE LIPASE, PANCRELIPASE PROTEASE, PANCRELIPASE AMYLASE 40000; 126000; 168000 [USP'U]/1; [USP'U]/1; [USP'U]/1
2 CAPSULES WITH MEALS, ONE WITH SNACKS CAPSULE, DELAYED RELEASE ORAL
OUTPATIENT
Start: 2025-04-24

## 2025-04-25 RX ORDER — COLESEVELAM HYDROCHLORIDE 625 MG/1
1 TABLETS WITH A MEAL TABLET, COATED ORAL TWICE A DAY
Qty: 180 TABLET | Refills: 3 | OUTPATIENT
Start: 2025-04-24

## 2025-04-25 RX ORDER — BUDESONIDE 9 MG/1
TAKE 1 TABLET BY MOUTH EVERY DAY IN THE MORNING FOR 30 DAYS TABLET, EXTENDED RELEASE ORAL
Qty: 30 TABLET | Refills: 1 | Status: ON HOLD | COMMUNITY

## 2025-04-25 RX ORDER — BUDESONIDE 3 MG/1
3 CAPSULES CAPSULE ORAL ONCE A DAY
Qty: 180 CAPSULE | Refills: 0 | Status: ACTIVE | COMMUNITY
Start: 2024-12-12

## 2025-04-25 RX ORDER — PANCRELIPASE LIPASE, PANCRELIPASE PROTEASE, PANCRELIPASE AMYLASE 40000; 126000; 168000 [USP'U]/1; [USP'U]/1; [USP'U]/1
TAKE 2 CAPSULES BY MOUTH WITH MEALS 3 TIMES A DAY AND TAKE 1 CAPSULE BY MOUTH WITH SNACKS DAILY CAPSULE, DELAYED RELEASE ORAL
Qty: 200 CAPSULE | Refills: 5 | Status: ACTIVE | COMMUNITY

## 2025-04-25 RX ORDER — TERBINAFINE HYDROCHLORIDE 250 MG/1
1 TABLET TABLET ORAL ONCE A DAY
Status: ACTIVE | COMMUNITY

## 2025-04-25 RX ORDER — VALACYCLOVIR 1 G/1
1 TABLET TABLET, FILM COATED ORAL ONCE A DAY
Status: ACTIVE | COMMUNITY

## 2025-04-25 RX ORDER — PANCRELIPASE LIPASE, PANCRELIPASE PROTEASE, PANCRELIPASE AMYLASE 40000; 126000; 168000 [USP'U]/1; [USP'U]/1; [USP'U]/1
2 CAPSULES WITH MEALS, ONE WITH SNACKS CAPSULE, DELAYED RELEASE ORAL
Status: ACTIVE | COMMUNITY

## 2025-04-25 RX ORDER — FINASTERIDE 1 MG/1
1 TABLET TABLET, FILM COATED ORAL ONCE A DAY
Status: ACTIVE | COMMUNITY

## 2025-04-25 NOTE — HPI-TODAY'S VISIT:
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  He was seen in the office 8/8/2023 in follow-up for evaluation of chronic diarrhea.  Stool studies revealed a low pancreas elastase consistent with exocrine pancreas insufficiency.  He was also noted to have an elevated fecal calprotectin with colonoscopy notable for random biopsies positive for collagenous colitis.  He was treated with budesonide 9 mg daily for 8 weeks, then stopping.   He has had 2 episodes of diarrhea lasting about 4 weeks each since his last appointment.  When he has the diarrhea we will have 7-8 stools per day that are liquid.  Sometimes he will have a urgency at least for the first week.  He has had increased stress recently.  He denies any antibiotic use.  He does use Pepto-Bismol and that seems to help as well his diarrhea now is starting to get better and he has semiformed stools currently.  When he is good his stools are normal about 2/day.  He has had no weight loss or fever.  He does use Zenpep 2 tablets with each meal.  He has noticed that when he eats a big fatty meal he will have lots of diarrhea the next few days.  He does use his bile binder 1 tablet twice a day.  He rarely will have heartburn there is been no dysphagia and he has no abdominal pain but sometimes he has some gas discomfort.  There is no nausea or vomiting.  He has noticed no bright red blood or melena.  He has not been on any antibiotics recently and has had no well water or stream water use.  Is been no new medications.  He is taking colesevelam 625 mg as 2 tablets twice a day as well as Zenpep 40,000 units capsules taken as 2 capsules with each meal and 1 with snack.  On 12/12/2024 he called regarding recurrent diarrhea despite taking Colesevelam.  He was given budesonide 3 mg tablets taken as 3 tablets every day for 8 weeks.  After taking a course of budesonide, he will be good for about 3 months and then it slowly returns again.  He is slowly having more diarrhea this week.  When he has his symptoms he will get some gaseousness and flatus as well as discomfort in the abdomen.  There is no fevers or chills.  About 4 weeks ago he started having some increased gas and bloating.  That has now turned to watery stool.  There is no blood or melena.  There is no weight loss.  He denies any heartburn or dysphagia.  There is no bright red blood per rectum or any melena.  He takes his Zenpep only if he is going to eat a fatty meal.  He does feel that that helps.  When he is good he will have 1-2 formed bowel movements per day.  When he is having diarrhea it will be about 3-4 loose to watery bowel movements per day.  Blood work on 2/20/2025 revealed a sodium 139 potassium 4.4 BUN 16 creatinine 0.92.  AST 27, ALT 24, alkaline phosphate 60, total bili 0.7, albumin 4.0.  Blood work on 9/23/2024 revealed an AST of 15, ALT 16, alk phosphatase 54, total bili 0.6 and albumin 4.1.  On 7/25/2024 WBC is 7.1, hemoglobin 13.9 and platelet count 282,000.  Sodium 138 potassium 4.3 BUN 17 creatinine 1.02.

## 2025-08-11 ENCOUNTER — ERX REFILL RESPONSE (OUTPATIENT)
Dept: URBAN - METROPOLITAN AREA CLINIC 113 | Facility: CLINIC | Age: 55
End: 2025-08-11

## 2025-08-11 RX ORDER — PANCRELIPASE LIPASE, PANCRELIPASE PROTEASE, PANCRELIPASE AMYLASE 40000; 126000; 168000 [USP'U]/1; [USP'U]/1; [USP'U]/1
TAKE 2 CAPSULES BY MOUTH WITH MEALS 3 TIMES A DAY AND TAKE 1 CAPSULE BY MOUTH WITH SNACKS DAILY CAPSULE, DELAYED RELEASE ORAL
Qty: 200 CAPSULE | Refills: 5

## 2025-08-26 ENCOUNTER — OFFICE VISIT (OUTPATIENT)
Dept: URBAN - METROPOLITAN AREA CLINIC 113 | Facility: CLINIC | Age: 55
End: 2025-08-26
Payer: COMMERCIAL

## 2025-08-26 DIAGNOSIS — R19.7 ACUTE DIARRHEA: ICD-10-CM

## 2025-08-26 DIAGNOSIS — K52.831 COLLAGENOUS COLITIS: ICD-10-CM

## 2025-08-26 DIAGNOSIS — K86.81 EXOCRINE PANCREATIC INSUFFICIENCY: ICD-10-CM

## 2025-08-26 PROCEDURE — 99213 OFFICE O/P EST LOW 20 MIN: CPT | Performed by: INTERNAL MEDICINE

## 2025-08-26 RX ORDER — COLESEVELAM HYDROCHLORIDE 625 MG/1
1 TABLETS WITH A MEAL TABLET, COATED ORAL TWICE A DAY
Qty: 180 TABLET | Refills: 3 | Status: ACTIVE | COMMUNITY
Start: 2025-04-24

## 2025-08-26 RX ORDER — BUDESONIDE 9 MG/1
TAKE 1 TABLET BY MOUTH EVERY DAY IN THE MORNING FOR 30 DAYS TABLET, EXTENDED RELEASE ORAL
Qty: 30 TABLET | Refills: 1 | Status: ON HOLD | COMMUNITY

## 2025-08-26 RX ORDER — BUDESONIDE 3 MG/1
3 CAPSULES CAPSULE ORAL ONCE A DAY
Qty: 270 CAPSULE | Refills: 2 | Status: ACTIVE | COMMUNITY
Start: 2024-12-12

## 2025-08-26 RX ORDER — PANCRELIPASE LIPASE, PANCRELIPASE PROTEASE, PANCRELIPASE AMYLASE 40000; 126000; 168000 [USP'U]/1; [USP'U]/1; [USP'U]/1
TAKE 2 CAPSULES BY MOUTH WITH MEALS 3 TIMES A DAY AND TAKE 1 CAPSULE BY MOUTH WITH SNACKS DAILY CAPSULE, DELAYED RELEASE ORAL
Qty: 200 CAPSULE | Refills: 5 | Status: ACTIVE | COMMUNITY

## 2025-08-26 RX ORDER — FINASTERIDE 1 MG/1
1 TABLET TABLET, FILM COATED ORAL ONCE A DAY
Status: ACTIVE | COMMUNITY

## 2025-08-26 RX ORDER — TERBINAFINE HYDROCHLORIDE 250 MG/1
1 TABLET TABLET ORAL ONCE A DAY
Status: ON HOLD | COMMUNITY

## 2025-08-26 RX ORDER — VALACYCLOVIR 1 G/1
1 TABLET TABLET, FILM COATED ORAL ONCE A DAY
Status: ACTIVE | COMMUNITY